# Patient Record
Sex: FEMALE | Race: WHITE | NOT HISPANIC OR LATINO | URBAN - NONMETROPOLITAN AREA
[De-identification: names, ages, dates, MRNs, and addresses within clinical notes are randomized per-mention and may not be internally consistent; named-entity substitution may affect disease eponyms.]

---

## 2015-12-01 PROBLEM — H25.11: Noted: 2017-01-26

## 2015-12-01 PROBLEM — H43.813: Noted: 2017-01-26

## 2015-12-01 PROBLEM — H25.13: Noted: 2017-01-26

## 2015-12-01 PROBLEM — H25.12: Noted: 2017-01-26

## 2017-01-26 PROBLEM — H43.813: Noted: 2017-01-26

## 2019-01-31 ENCOUNTER — IMPORTED ENCOUNTER (OUTPATIENT)
Dept: URBAN - NONMETROPOLITAN AREA CLINIC 1 | Facility: CLINIC | Age: 74
End: 2019-01-31

## 2019-01-31 PROCEDURE — 92014 COMPRE OPH EXAM EST PT 1/>: CPT

## 2019-01-31 PROCEDURE — 92015 DETERMINE REFRACTIVE STATE: CPT

## 2019-01-31 NOTE — PATIENT DISCUSSION
Presbyopia OU- Discussed refractive status [facility] patient- New glasses Rx given today- Continue to [facility] OU- Discussed diagnosis in detail wit hpatient- Discussed signs and symptoms of progression - Discussed UV protection- BAT done today 20/400 OU- Patient would like to wait on treatment at this time - Progression today OU- Continue to monitorPVD OU- Discussed findings of exam in detail with the patient. - The risk of retinal detachment in patients with PVDs was discussed with the patient and the warning signs of retinal detachment were carefully reviewed with the patient. - The patient was warned to return to the office or contact the ophthalmologist on call immediately if they experience signs of retinal detachment. Retinal drusen unchanged OU- Discussed diagnosis in detail with patient- Optos done in the past shows no chage in drusen- Continue to monitor; 's Notes: MR 1/31/19DFE 1/31/19

## 2020-02-24 ENCOUNTER — IMPORTED ENCOUNTER (OUTPATIENT)
Dept: URBAN - NONMETROPOLITAN AREA CLINIC 1 | Facility: CLINIC | Age: 75
End: 2020-02-24

## 2020-02-24 PROCEDURE — 92014 COMPRE OPH EXAM EST PT 1/>: CPT

## 2020-02-24 PROCEDURE — 92015 DETERMINE REFRACTIVE STATE: CPT

## 2020-02-24 NOTE — PATIENT DISCUSSION
Presbyopia OU- Discussed refractive status [facility] patient- New glasses Rx given today- Continue to [facility] OU- Discussed diagnosis in detail wit hpatient- Discussed signs and symptoms of progression - Discussed UV protection- BAT done previously 20/400 OU- Patient would like to wait on treatment at this time - Progression today OU- Continue to monitorPVD OU- Discussed findings of exam in detail with the patient. - The risk of retinal detachment in patients with PVDs was discussed with the patient and the warning signs of retinal detachment were carefully reviewed with the patient. - The patient was warned to return to the office or contact the ophthalmologist on call immediately if they experience signs of retinal detachment. Retinal drusen unchanged OU- Discussed diagnosis in detail with patient- Optos done in the past shows no chage in drusen- Continue to monitor; 's Notes: MR 2/24/20DFE 2/24/20

## 2021-10-01 NOTE — PATIENT DISCUSSION
Discussed limited view without dilation, peep hole vs. door open, pathology could be missed.  DFEX 1-2 weeks bring a .

## 2021-10-15 NOTE — PATIENT DISCUSSION
10.15.2021 Discussed limited view without dilation, peep hole vs. door open, pathology could be missed.  DFEX 1-2 weeks bring a .

## 2022-04-10 ASSESSMENT — VISUAL ACUITY
OS_SC: 20/30-
OU_CC: 20/22+1
OS_SC: 20/20-2
OS_GLARE: 20/400
OD_CC: 20/32-2
OS_CC: 20/25-2
OD_GLARE: 20/400
OD_SC: 20/25-

## 2022-04-10 ASSESSMENT — TONOMETRY
OS_IOP_MMHG: 12
OD_IOP_MMHG: 12
OD_IOP_MMHG: 15
OS_IOP_MMHG: 15

## 2022-09-02 ENCOUNTER — EMERGENCY VISIT (OUTPATIENT)
Dept: URBAN - NONMETROPOLITAN AREA CLINIC 1 | Facility: CLINIC | Age: 77
End: 2022-09-02

## 2022-09-02 DIAGNOSIS — H52.4: ICD-10-CM

## 2022-09-02 DIAGNOSIS — H25.13: ICD-10-CM

## 2022-09-02 PROCEDURE — 92015 DETERMINE REFRACTIVE STATE: CPT

## 2022-09-02 PROCEDURE — 99214 OFFICE O/P EST MOD 30 MIN: CPT

## 2022-09-02 ASSESSMENT — VISUAL ACUITY
OS_PH: 20/50
OD_SC: 20/400
OD_PH: 20/60
OS_SC: 20/100-1

## 2022-09-02 ASSESSMENT — TONOMETRY
OS_IOP_MMHG: 14
OD_IOP_MMHG: 13

## 2022-09-02 NOTE — PATIENT DISCUSSION
Discussed diagnosis in detail with patient. Discussed signs and symptoms of progression. Discussed UV protection. Progression noted today OU, recommend cataract eval with Dr. Kang Huertas. Patient agrees with plan. Continue to monitor. Needs BAT before seeing Dr. Kang Huertas.

## 2022-09-02 NOTE — PATIENT DISCUSSION
MR done today by Dr. Yariel Miner and new glasses RX given to get patient by until cataract eval. Explained to patient that she MUST get glasses RX to get her by or she is NOT legal to drive. Patient understands. Continue to monitor.

## 2022-11-30 ENCOUNTER — CONSULTATION/EVALUATION (OUTPATIENT)
Dept: URBAN - NONMETROPOLITAN AREA CLINIC 1 | Facility: CLINIC | Age: 77
End: 2022-11-30

## 2022-11-30 DIAGNOSIS — H25.13: ICD-10-CM

## 2022-11-30 PROCEDURE — 99214 OFFICE O/P EST MOD 30 MIN: CPT

## 2022-11-30 ASSESSMENT — VISUAL ACUITY
OS_SC: 20/50
OS_PH: 20/40
OS_CC: 20/50-1
OD_SC: 20/400
OD_PAM: 20/20
OS_AM: 20/20
OD_CC: 20/40
OD_SC: 20/30-2
OS_SC: 20/400

## 2022-11-30 ASSESSMENT — TONOMETRY
OD_IOP_MMHG: 14
OS_IOP_MMHG: 14

## 2022-11-30 NOTE — PATIENT DISCUSSION
Visually significant cataract. Cataract(s) causing symptomatic impairment of visual function not correctable with a tolerable change in glasses or contact lenses, lighting, or non-operative means resulting in specific activity limitations and/or participation restrictions including, but not limited to reading, viewing television, driving, or meeting vocational or recreational needs. Expectation is clearer vision and functional improvement in symptoms as well as reduced glare disability after cataract removal. Order IOL Master and OPD today. Recommend Toric IOL / Limbal Relaxing Incisions / Multifocal, based on today's OPD testing and lifestyle questionnaire. All questions were answered regarding surgery, including pre- and post-op medications, appointments, activity restrictions and anesthetic usage. The risks, benefits and alternatives, and special risk factors for the patient, were discussed in detail, including but not limited to bleeding, infection, retinal detachment, vitreous loss, problems with the implant, and possible need for additional surgery. Although rare, the possibility of complete vision loss was discussed. The possible need for glasses post-operatively was discussed. Order medical clearance exam based on history of hypertension. Patient elects to proceed with cataract surgery OS . Will schedule at patient's convenience and re-evaluate OD in the future.

## 2022-11-30 NOTE — PATIENT DISCUSSION
Based off todays testing, patient qualifies for LRI OD and TORIC OS. Recommend patient begin artificial tears & Lotemax TID OU x 1 week and return to clinic for Repeat Topos. Drops given to patient with instructions today.

## 2022-11-30 NOTE — PATIENT DISCUSSION
MR done today by Dr. Della Gonsalez and new glasses RX given to get patient by until cataract eval. Explained to patient that she MUST get glasses RX to get her by or she is NOT legal to drive. Patient understands. Continue to monitor.

## 2022-11-30 NOTE — PATIENT DISCUSSION
Astigmatism Treatment LRI OD/TORIC OS : The need for reading glasses after surgery with LRI & TORIC was discussed with the patient. The patient elects to proceed with plan.

## 2022-12-14 ENCOUNTER — PRE-OP/H&P (OUTPATIENT)
Dept: URBAN - NONMETROPOLITAN AREA CLINIC 1 | Facility: CLINIC | Age: 77
End: 2022-12-14

## 2022-12-14 VITALS
DIASTOLIC BLOOD PRESSURE: 90 MMHG | BODY MASS INDEX: 24.63 KG/M2 | HEART RATE: 68 BPM | HEIGHT: 63 IN | WEIGHT: 139 LBS | SYSTOLIC BLOOD PRESSURE: 174 MMHG

## 2022-12-14 PROCEDURE — 99499 UNLISTED E&M SERVICE: CPT

## 2022-12-14 NOTE — PATIENT DISCUSSION
MR done today by Dr. Naseem Monahan and new glasses RX given to get patient by until cataract eval. Explained to patient that she MUST get glasses RX to get her by or she is NOT legal to drive. Patient understands. Continue to monitor.

## 2023-01-03 ENCOUNTER — SURGERY/PROCEDURE (OUTPATIENT)
Dept: URBAN - NONMETROPOLITAN AREA CLINIC 1 | Facility: CLINIC | Age: 78
End: 2023-01-03

## 2023-01-03 DIAGNOSIS — H25.12: ICD-10-CM

## 2023-01-03 PROCEDURE — 68841 INSJ RX ELUT IMPLT LAC CANAL: CPT

## 2023-01-03 PROCEDURE — 99199PCV PROF CUSTOM VISION PACKAGE

## 2023-01-03 PROCEDURE — 66984CV REMOVE CATARACT, INSERT LENS, CUSTOM VISION

## 2023-01-03 NOTE — PATIENT DISCUSSION
MR done today by Dr. Shahab Almeida and new glasses RX given to get patient by until cataract eval. Explained to patient that she MUST get glasses RX to get her by or she is NOT legal to drive. Patient understands. Continue to monitor.

## 2023-01-04 ENCOUNTER — POST OP/EVAL OF SECOND EYE (OUTPATIENT)
Dept: URBAN - NONMETROPOLITAN AREA CLINIC 1 | Facility: CLINIC | Age: 78
End: 2023-01-04

## 2023-01-04 VITALS
HEIGHT: 63 IN | SYSTOLIC BLOOD PRESSURE: 168 MMHG | WEIGHT: 139 LBS | HEART RATE: 69 BPM | DIASTOLIC BLOOD PRESSURE: 94 MMHG | BODY MASS INDEX: 24.63 KG/M2

## 2023-01-04 DIAGNOSIS — Z98.42: ICD-10-CM

## 2023-01-04 DIAGNOSIS — Z01.818: ICD-10-CM

## 2023-01-04 PROCEDURE — 99024 POSTOP FOLLOW-UP VISIT: CPT

## 2023-01-04 ASSESSMENT — VISUAL ACUITY
OD_PH: 20/50
OD_PAM: 20/20
OD_SC: 20/200
OS_SC: 20/50-2

## 2023-01-04 ASSESSMENT — TONOMETRY
OS_IOP_MMHG: 24
OD_IOP_MMHG: 18

## 2023-01-10 ENCOUNTER — POST OP/EVAL OF SECOND EYE (OUTPATIENT)
Dept: URBAN - NONMETROPOLITAN AREA CLINIC 1 | Facility: CLINIC | Age: 78
End: 2023-01-10

## 2023-01-10 DIAGNOSIS — H25.11: ICD-10-CM

## 2023-01-10 PROCEDURE — 99213 OFFICE O/P EST LOW 20 MIN: CPT

## 2023-01-10 ASSESSMENT — TONOMETRY
OD_IOP_MMHG: 16
OS_IOP_MMHG: 15

## 2023-01-10 ASSESSMENT — VISUAL ACUITY
OD_PAM: 20/20
OD_SC: 20/400
OD_CC: 20/25
OS_SC: 20/30+1

## 2023-01-10 NOTE — PATIENT DISCUSSION
MR done today by Dr. Ant Helms and new glasses RX given to get patient by until cataract eval. Explained to patient that she MUST get glasses RX to get her by or she is NOT legal to drive. Patient understands. Continue to monitor.

## 2023-01-10 NOTE — PATIENT DISCUSSION
(Surgical) Visually Significant (secondary to glare), discussed the risks, benefits, alternatives, and limitations of cataract surgery. The patient stated a full understanding and a desire to proceed with the procedure. The patient will need to return for pre-op appointment with cataract measurements and to have any additional questions answered, and start pre-operative eye drops as directed. Patient okay to proceed A/S with Dr. Reyes Delatorre.

## 2023-01-17 ENCOUNTER — SURGERY/PROCEDURE (OUTPATIENT)
Dept: URBAN - NONMETROPOLITAN AREA CLINIC 1 | Facility: CLINIC | Age: 78
End: 2023-01-17

## 2023-01-17 DIAGNOSIS — H25.11: ICD-10-CM

## 2023-01-17 PROCEDURE — 66984CV REMOVE CATARACT, INSERT LENS, CUSTOM VISION

## 2023-01-17 PROCEDURE — 99199PCV PROF CUSTOM VISION PACKAGE

## 2023-01-17 PROCEDURE — 68841 INSJ RX ELUT IMPLT LAC CANAL: CPT

## 2023-01-17 NOTE — PATIENT DISCUSSION
MR done today by Dr. Nascimento Standard and new glasses RX given to get patient by until cataract eval. Explained to patient that she MUST get glasses RX to get her by or she is NOT legal to drive. Patient understands. Continue to monitor.

## 2023-01-18 ENCOUNTER — POST-OP (OUTPATIENT)
Dept: URBAN - NONMETROPOLITAN AREA CLINIC 1 | Facility: CLINIC | Age: 78
End: 2023-01-18

## 2023-01-18 DIAGNOSIS — Z98.41: ICD-10-CM

## 2023-01-18 DIAGNOSIS — Z98.42: ICD-10-CM

## 2023-01-18 PROCEDURE — 99024 POSTOP FOLLOW-UP VISIT: CPT

## 2023-01-18 ASSESSMENT — VISUAL ACUITY
OU_SC: 20/22-1
OS_SC: 20/22-2
OD_PH: 20/40
OD_SC: 20/63-1

## 2023-01-18 ASSESSMENT — TONOMETRY
OS_IOP_MMHG: 16
OD_IOP_MMHG: 22

## 2023-01-24 ENCOUNTER — POST-OP (OUTPATIENT)
Dept: URBAN - NONMETROPOLITAN AREA CLINIC 1 | Facility: CLINIC | Age: 78
End: 2023-01-24

## 2023-01-24 DIAGNOSIS — Z98.41: ICD-10-CM

## 2023-01-24 DIAGNOSIS — Z98.42: ICD-10-CM

## 2023-01-24 PROCEDURE — 99024 POSTOP FOLLOW-UP VISIT: CPT

## 2023-01-24 ASSESSMENT — TONOMETRY
OD_IOP_MMHG: 16
OS_IOP_MMHG: 16

## 2023-01-24 ASSESSMENT — VISUAL ACUITY
OU_SC: 20/30
OD_SC: 20/30+2
OS_SC: 20/30-2

## 2023-01-24 NOTE — PATIENT DISCUSSION
Continue with OTC readers PRN. If she wants glasses full time, we can do MR next visit in 3 months. Pt agreed with plan.

## 2023-05-08 ENCOUNTER — FOLLOW UP (OUTPATIENT)
Dept: URBAN - NONMETROPOLITAN AREA CLINIC 1 | Facility: CLINIC | Age: 78
End: 2023-05-08

## 2023-05-08 DIAGNOSIS — H52.4: ICD-10-CM

## 2023-05-08 DIAGNOSIS — H43.813: ICD-10-CM

## 2023-05-08 DIAGNOSIS — Z96.1: ICD-10-CM

## 2023-05-08 PROCEDURE — 92015 DETERMINE REFRACTIVE STATE: CPT

## 2023-05-08 PROCEDURE — 99213 OFFICE O/P EST LOW 20 MIN: CPT

## 2023-05-08 ASSESSMENT — TONOMETRY
OD_IOP_MMHG: 15
OS_IOP_MMHG: 15

## 2023-05-08 ASSESSMENT — VISUAL ACUITY
OS_SC: 20/20-
OU_SC: 20/20
OD_SC: 20/22

## 2024-05-10 ENCOUNTER — ESTABLISHED PATIENT (OUTPATIENT)
Dept: URBAN - NONMETROPOLITAN AREA CLINIC 1 | Facility: CLINIC | Age: 79
End: 2024-05-10

## 2024-05-10 DIAGNOSIS — Z96.1: ICD-10-CM

## 2024-05-10 DIAGNOSIS — H43.813: ICD-10-CM

## 2024-05-10 DIAGNOSIS — H26.493: ICD-10-CM

## 2024-05-10 DIAGNOSIS — H16.223: ICD-10-CM

## 2024-05-10 DIAGNOSIS — H52.4: ICD-10-CM

## 2024-05-10 PROCEDURE — 92015 DETERMINE REFRACTIVE STATE: CPT

## 2024-05-10 PROCEDURE — 99214 OFFICE O/P EST MOD 30 MIN: CPT

## 2024-05-10 ASSESSMENT — VISUAL ACUITY
OS_CC: 20/32
OU_CC: 20/32
OD_CC: 20/32-1

## 2024-05-10 ASSESSMENT — TONOMETRY
OD_IOP_MMHG: 15
OS_IOP_MMHG: 14

## 2025-05-13 ENCOUNTER — COMPREHENSIVE EXAM (OUTPATIENT)
Age: 80
End: 2025-05-13

## 2025-05-13 DIAGNOSIS — H43.813: ICD-10-CM

## 2025-05-13 DIAGNOSIS — H16.223: ICD-10-CM

## 2025-05-13 DIAGNOSIS — Z96.1: ICD-10-CM

## 2025-05-13 DIAGNOSIS — H26.493: ICD-10-CM

## 2025-05-13 DIAGNOSIS — H52.4: ICD-10-CM

## 2025-05-13 PROCEDURE — 92015 DETERMINE REFRACTIVE STATE: CPT

## 2025-05-13 PROCEDURE — 92014 COMPRE OPH EXAM EST PT 1/>: CPT
